# Patient Record
Sex: MALE | Race: WHITE | Employment: FULL TIME | URBAN - METROPOLITAN AREA
[De-identification: names, ages, dates, MRNs, and addresses within clinical notes are randomized per-mention and may not be internally consistent; named-entity substitution may affect disease eponyms.]

---

## 2024-06-17 ENCOUNTER — HOSPITAL ENCOUNTER (OUTPATIENT)
Age: 19
Discharge: HOME | End: 2024-06-17

## 2024-06-17 VITALS
RESPIRATION RATE: 16 BRPM | TEMPERATURE: 98 F | DIASTOLIC BLOOD PRESSURE: 71 MMHG | OXYGEN SATURATION: 99 % | WEIGHT: 185 LBS | BODY MASS INDEX: 21.84 KG/M2 | HEIGHT: 77 IN | SYSTOLIC BLOOD PRESSURE: 145 MMHG | HEART RATE: 57 BPM

## 2024-06-17 DIAGNOSIS — J01.90 ACUTE NON-RECURRENT SINUSITIS, UNSPECIFIED LOCATION: ICD-10-CM

## 2024-06-17 DIAGNOSIS — R04.0 EPISTAXIS: Primary | ICD-10-CM

## 2024-06-17 PROCEDURE — 99204 OFFICE O/P NEW MOD 45 MIN: CPT | Performed by: NURSE PRACTITIONER

## 2024-06-17 RX ORDER — METHYLPREDNISOLONE 4 MG/1
TABLET ORAL
Qty: 1 EACH | Refills: 0 | Status: SHIPPED | OUTPATIENT
Start: 2024-06-17

## 2024-06-17 NOTE — DISCHARGE INSTRUCTIONS
Start Medrol Dosepak in the morning with food.  Take once a day.  Once complete Medrol Dosepak may start using Flonase nasal spray which she can buy over-the-counter.  Take a daily antihistamine such as Zyrtec Allegra or Claritin or Xyzal.  Follow-up with your primary care physician or ENT next 1 to 2 weeks if symptoms persist.  Go directly to the ER for any uncontrolled nosebleed

## 2024-06-18 NOTE — ED PROVIDER NOTES
Patient Seen in: [facility]      History     Chief Complaint   Patient presents with    Nose Bleed     Stated Complaint: nose bleeds    Subjective:   19-year-old male presents today with the intermittent bloody nose over the last 3 days.  States has a history of this in the past.  Has never seen anybody for this but is usually not as frequently as it has been over the last couple days.  Denies any sinus pressure congestion runny nose.  No fever or chills.  Bleeding has since stopped.  No other symptoms or concerns at this time.  The patient's medication list, past medical history and social history elements as listed in today's nurse's notes were reviewed and agreed (except as otherwise stated in the HPI).  The patient's family history reviewed and determined to be noncontributory to the presenting problem            Objective:   Past Medical History:    Arthritis              History reviewed. No pertinent surgical history.             Social History     Socioeconomic History    Marital status: Single     Social Determinants of Health      Received from [facility]    Social Connections    Received from [facility]    Housing Stability              Review of Systems    Positive for stated complaint: nose bleeds  Other systems are as noted in HPI.  Constitutional and vital signs reviewed.      All other systems reviewed and negative except as noted above.    Physical Exam     ED Triage Vitals [06/17/24 1825]   /71   Pulse 57   Resp 16   Temp 98.1 °F (36.7 °C)   Temp src Temporal   SpO2 99 %   O2 Device None (Room air)       Current Vitals:   Vital Signs  BP: 145/71  Pulse: 57  Resp: 16  Temp: 98.1 °F (36.7 °C)  Temp src: Temporal    Oxygen Therapy  SpO2: 99 %  O2 Device: None (Room air)            Physical Exam  Vitals and nursing note reviewed.   Constitutional:       Appearance: Normal appearance. He is well-developed.   HENT:      Head: Normocephalic.       Right Ear: Tympanic membrane and ear canal normal.      Left Ear: Tympanic membrane and ear canal normal.      Nose: Mucosal edema present.      Right Turbinates: Swollen.      Left Turbinates: Swollen.      Comments: Swelling and erythema to the turbinates.     Mouth/Throat:      Pharynx: Uvula midline. Posterior oropharyngeal erythema present.   Eyes:      Conjunctiva/sclera: Conjunctivae normal.      Pupils: Pupils are equal, round, and reactive to light.   Cardiovascular:      Rate and Rhythm: Normal rate and regular rhythm.   Pulmonary:      Effort: Pulmonary effort is normal.      Breath sounds: Normal breath sounds.   Musculoskeletal:      Cervical back: Normal range of motion and neck supple.   Lymphadenopathy:      Cervical: No cervical adenopathy.   Skin:     General: Skin is warm and dry.   Neurological:      Mental Status: He is alert and oriented to person, place, and time.               ED Course   Labs Reviewed - No data to display                   MDM     Please note that this report has been produced using speech recognition software and may contain errors related to that system including, but not limited to, errors in grammar, punctuation, and spelling, as well as words and phrases that possibly may have been recognized inappropriately.  If there are any questions or concerns, contact the dictating provider for clarification.        Note to patient: The 21st Century Cures Act makes medical notes like these available to patients in the interest of transparency. However, this is a medical document intended as peer to peer communication. It is written in medical language and may contain abbreviations or verbiage that are unfamiliar. It may appear blunt or direct. Medical documents are intended to carry relevant information, facts as evident, and the clinical opinion of the practitioner.                                   Medical Decision Making  Differential diagnosis includes but is not limited to:  Acute sinusitis, epistaxis      Presents today with frequent nosebleeds over the last 2 to 3 days.  On exam does have swelling and erythema to bilateral turbinates.  Will give prescription for Medrol Dosepak.  Also encouraged to take over-the-counter antihistamine daily.  To follow-up with primary care physician and/or ENT if symptoms do not improve.  To go directly there with any uncontrolled nasal bleeding.  Patient verbalized understanding and agreed to plan of care.    Risk  OTC drugs.  Prescription drug management.        Disposition and Plan     Clinical Impression:  1. Epistaxis    2. Acute non-recurrent sinusitis, unspecified location         Disposition:  Discharge  6/17/2024  6:42 pm    Follow-up:  Abe Oropeza MD  [address]  [facility] [address]  [phone]    In 1 week  As needed          Medications Prescribed:  Discharge Medication List as of 6/17/2024  6:41 PM        START taking these medications    Details   methylPREDNISolone (MEDROL) 4 MG Oral Tablet Therapy Pack Dosepack: take as directed, Normal, Disp-1 each, R-0

## 2025-03-27 ENCOUNTER — HOSPITAL ENCOUNTER (OUTPATIENT)
Age: 20
Discharge: HOME | End: 2025-03-27
Payer: COMMERCIAL

## 2025-03-27 VITALS
BODY MASS INDEX: 21 KG/M2 | RESPIRATION RATE: 18 BRPM | OXYGEN SATURATION: 96 % | SYSTOLIC BLOOD PRESSURE: 136 MMHG | TEMPERATURE: 99 F | HEART RATE: 86 BPM | WEIGHT: 180 LBS | DIASTOLIC BLOOD PRESSURE: 83 MMHG

## 2025-03-27 DIAGNOSIS — R30.0 DYSURIA: Primary | ICD-10-CM

## 2025-03-27 DIAGNOSIS — R35.0 URINARY FREQUENCY: ICD-10-CM

## 2025-03-27 DIAGNOSIS — R39.15 URINARY URGENCY: ICD-10-CM

## 2025-03-27 LAB
BILIRUB UR QL STRIP: NEGATIVE
CLARITY UR: CLEAR
COLOR UR: YELLOW
GLUCOSE UR STRIP-MCNC: NEGATIVE MG/DL
KETONES UR STRIP-MCNC: NEGATIVE MG/DL
LEUKOCYTE ESTERASE UR QL STRIP: NEGATIVE
NITRITE UR QL STRIP: NEGATIVE
PH UR STRIP: 6 [PH]
PROT UR STRIP-MCNC: NEGATIVE MG/DL
SP GR UR STRIP: >=1.03
UROBILINOGEN UR STRIP-ACNC: <2 MG/DL

## 2025-03-27 PROCEDURE — 99213 OFFICE O/P EST LOW 20 MIN: CPT | Performed by: NURSE PRACTITIONER

## 2025-03-27 PROCEDURE — 81002 URINALYSIS NONAUTO W/O SCOPE: CPT | Performed by: NURSE PRACTITIONER

## 2025-03-27 PROCEDURE — 87086 URINE CULTURE/COLONY COUNT: CPT | Performed by: NURSE PRACTITIONER

## 2025-03-27 NOTE — ED INITIAL ASSESSMENT (HPI)
Pt with urinary urgency and increased frequency, has pain with urination and feeling that he is unable to fully empty bladder that started today.

## 2025-03-27 NOTE — DISCHARGE INSTRUCTIONS
Call the urologist tomorrow or on Monday to arrange a follow-up appointment.    Go to the emergency department for new or worsening symptoms.

## 2025-03-27 NOTE — ED PROVIDER NOTES
Patient Seen in: [facility]      History     Chief Complaint   Patient presents with    Urinary Symptoms     Stated Complaint: trouble going to the bathroom    Subjective:   20-year-old male who presents with on and off urinary frequency and urgency over the last 3 years.  States this particular episode started today.  He also states there is some pain while urinating.  Denies any unusual color to the urine.  Denies blood in the urine.  Denies abdominal pain.  Denies nausea or vomiting.  Denies STD concerns.  Denies any unusual rashes or lesions.              Objective:     Past Medical History:    Arthritis              History reviewed. No pertinent surgical history.             Social History     Socioeconomic History    Marital status: Single   Tobacco Use    Smoking status: Never    Smokeless tobacco: Never   Vaping Use    Vaping status: Never Used     Social Drivers of Health     Food Insecurity: No Food Insecurity (2/6/2025)    Received from [facility]    Food Insecurity     Currently or in the past 3 months, have you worried your food would run out before you had money to buy more?: No     In the past 12 months, have you run out of food or been unable to get more?: No   Transportation Needs: No Transportation Needs (2/6/2025)    Received from [facility]    Transportation Needs     Currently or in the past 3 months, has lack of transportation kept you from medical appointments, getting food or medicine, or providing care to a family member?: Unrecognized value     Medical Transportation Needs?: No    Received from [facility]    Housing Stability              Review of Systems   Gastrointestinal: Negative.    Genitourinary:  Positive for frequency and urgency.       Positive for stated complaint: trouble going to the bathroom  Other systems are as noted in HPI.  Constitutional and vital signs reviewed.      All other systems reviewed and  negative except as noted above.    Physical Exam     ED Triage Vitals [03/27/25 1741]   /83   Pulse 86   Resp 18   Temp 98.6 °F (37 °C)   Temp src Oral   SpO2 96 %   O2 Device None (Room air)       Current Vitals:   Vital Signs  BP: 136/83  Pulse: 86  Resp: 18  Temp: 98.6 °F (37 °C)  Temp src: Oral    Oxygen Therapy  SpO2: 96 %  O2 Device: None (Room air)        Physical Exam  Vitals and nursing note reviewed.   Constitutional:       General: He is not in acute distress.     Appearance: Normal appearance. He is not ill-appearing, toxic-appearing or diaphoretic.   Cardiovascular:      Rate and Rhythm: Normal rate and regular rhythm.      Pulses: Normal pulses.      Heart sounds: Normal heart sounds.   Pulmonary:      Effort: Pulmonary effort is normal. No respiratory distress.      Breath sounds: Normal breath sounds.   Abdominal:      General: Abdomen is flat.      Palpations: Abdomen is soft.      Tenderness: There is no abdominal tenderness. There is no guarding.   Skin:     General: Skin is warm and dry.      Coloration: Skin is not jaundiced or pale.   Neurological:      Mental Status: He is alert and oriented to person, place, and time.   Psychiatric:         Behavior: Behavior normal.             ED Course     Labs Reviewed   [facility] POCT URINALYSIS DIPSTICK - Abnormal; Notable for the following components:       Result Value    Blood, Urine Trace-Intact (*)     All other components within normal limits   URINE CULTURE, ROUTINE                   MDM              Medical Decision Making  Differential diagnosis initially included but was not limited to: UTI, overactive bladder, nonspecific dysuria    Nontoxic adult male patient.  No abdominal tenderness.  No CVA tenderness.    Negative nitrates and leukocytes.  Trace blood.  The rest of the urine dip is unremarkable.  Will send for culture.  Patient to follow-up with urology    Supportive/home management of diagnosis/illness/injury discussed. Red flag symptoms  discussed.  Signs and symptoms/criteria that would necessitate reevaluation, including ER evaluation discussed.  Patient and/or responsible adult verbalize and agree with management and plan of care.    Speech recognition software was used during this dictation.  There may be minor errors in transcription.      Amount and/or Complexity of Data Reviewed  Labs: ordered. Decision-making details documented in ED Course.        Disposition and Plan     Clinical Impression:  1. Dysuria    2. Urinary frequency    3. Urinary urgency         Disposition:  Discharge  3/27/2025  6:25 pm    Follow-up:  Homer Child MD  [address] [place] [id number]  [phone]      Urology recommendation          Medications Prescribed:  Current Discharge Medication List              Supplementary Documentation:

## 2025-05-07 ENCOUNTER — OFFICE VISIT (OUTPATIENT)
Dept: SURGERY | Facility: CLINIC | Age: 20
End: 2025-05-07

## 2025-05-07 DIAGNOSIS — R10.2 PELVIC PAIN: ICD-10-CM

## 2025-05-07 DIAGNOSIS — N32.81 OAB (OVERACTIVE BLADDER): ICD-10-CM

## 2025-05-07 DIAGNOSIS — R82.90 URINE FINDING: Primary | ICD-10-CM

## 2025-05-07 LAB
APPEARANCE: CLEAR
BILIRUBIN: NEGATIVE
GLUCOSE (URINE DIPSTICK): NEGATIVE MG/DL
KETONES (URINE DIPSTICK): NEGATIVE MG/DL
MULTISTIX LOT#: ABNORMAL NUMERIC
NITRITE, URINE: NEGATIVE
OCCULT BLOOD: NEGATIVE
PH, URINE: 6.5 (ref 4.5–8)
PROTEIN (URINE DIPSTICK): NEGATIVE MG/DL
SPECIFIC GRAVITY: 1.02 (ref 1–1.03)
URINE-COLOR: YELLOW
UROBILINOGEN,SEMI-QN: 0.2 MG/DL (ref 0–1.9)

## 2025-05-07 PROCEDURE — 51798 US URINE CAPACITY MEASURE: CPT | Performed by: UROLOGY

## 2025-05-07 PROCEDURE — 99203 OFFICE O/P NEW LOW 30 MIN: CPT | Performed by: UROLOGY

## 2025-05-07 PROCEDURE — 81003 URINALYSIS AUTO W/O SCOPE: CPT | Performed by: UROLOGY

## 2025-05-07 NOTE — PROGRESS NOTES
Urology Clinic Note - New Patient    Referring Provider:  No referring provider defined for this encounter.     Primary Care Provider:  Trini Dodge     Chief Complaint:     Urinary urgency, pelvic pain    HPI:     Jg Madden is a 20 year old male with no significant previous medical history referred for urinary urgency.    Patient presents for evaluation of urgency for the past 3 years.  Patient was recently seen in the urgent care for ongoing urinary frequency and urgency which was more severe in nature but has been going on for about 3 years as noted above.  Urine was negative at that urgent care visit.  Today, he voices ongoing urgency with small voids.  He has occasional dysuria with incomplete bladder emptying and weak stream.  Reports that he has these experiences only a few times a month.  Usually then recovers and goes back to baseline normal voiding.  Today's voiding is at baseline he has no significant complaints.  He has no previous urologic history.  No history of stones or UTI.  His AUA symptom score is 8 and his PVR today is 48.  His UA is negative for blood or significant infection.  Occasional split stream. Patient does report drinking 3 sodas daily, he is cut down to 1 recently without significant change to his urination.  No significant constipation.  No history of urologic trauma        PSA:  No results found for: \"PSA\", \"PERCENTPSA\", \"PSAS\", \"PSAULTRA\", \"QPSA\", \"PSATOT\", \"TOTPSADX\", \"TOTPSASCREEN\"   No Cr or GFR on file.      History:     Past Medical History:    Arthritis       No past surgical history on file.    No family history on file.    Social History     Socioeconomic History    Marital status: Single   Tobacco Use    Smoking status: Never    Smokeless tobacco: Never   Vaping Use    Vaping status: Never Used     Social Drivers of Health     Food Insecurity: No Food Insecurity (2/6/2025)    Received from [facility]    Food Insecurity     Currently or in  the past 3 months, have you worried your food would run out before you had money to buy more?: No     In the past 12 months, have you run out of food or been unable to get more?: No   Transportation Needs: No Transportation Needs (2/6/2025)    Received from [facility]    Transportation Needs     Currently or in the past 3 months, has lack of transportation kept you from medical appointments, getting food or medicine, or providing care to a family member?: No     Medical Transportation Needs?: No    Received from [facility]    Housing Stability       Medications (Active prior to today's visit):  Current Outpatient Medications   Medication Sig Dispense Refill    methylPREDNISolone (MEDROL) 4 MG Oral Tablet Therapy Pack Dosepack: take as directed (Patient not taking: Reported on 5/7/2025) 1 each 0    ibuprofen 200 MG Oral Tab Take 200 mg by mouth every 6 (six) hours as needed for Pain. (Patient not taking: Reported on 5/7/2025)         Allergies:  Allergies   Allergen Reactions    Meloxicam OTHER (SEE COMMENTS)     Abd. pain         Review of Systems:   A comprehensive 10-point review of systems was completed.  Pertinent positives and negatives are noted in the the HPI.    Physical Exam:   CONSTITUTIONAL: Well developed, well nourished, in no acute distress  NEUROLOGIC: Alert and oriented  HEAD: Normocephalic, atraumatic  ENT: Hearing intact   RESPIRATORY: Normal respiratory effort  SKIN: No evident rashes  ABDOMEN: Soft, non-tender, non-distended,     No results found.      Assessment & Plan:     LUTS  Pelvic pain, feeling of incomplete emptying    Discussed patient's symptoms in detail.  Patient has no overt risk factors for the above symptoms, reassuring that this only happens a few times a month.  It does bother him quite a bit.  I discussed cystoscopy to rule out a urethral stricture given his slow stream, split stream.  He would like to do this.  Discussed behavioral  modification.  Discussed cutting down on soda.  Discussed potential pelvic floor physical therapy in the future.  Finally, discussed OAB medication if this becomes more bothersome.    Thank you for this consult.    I have personally reviewed all relevant medical records, labs, and imaging.      Kwasi Schrader MD  Staff Urologist  [facility]  Office: [phone]

## 2025-06-26 ENCOUNTER — PROCEDURE (OUTPATIENT)
Facility: LOCATION | Age: 20
End: 2025-06-26
Payer: COMMERCIAL

## 2025-06-26 ENCOUNTER — TELEPHONE (OUTPATIENT)
Dept: SURGERY | Facility: CLINIC | Age: 20
End: 2025-06-26

## 2025-06-26 DIAGNOSIS — R82.90 URINE FINDING: Primary | ICD-10-CM

## 2025-06-26 PROCEDURE — 99213 OFFICE O/P EST LOW 20 MIN: CPT | Performed by: UROLOGY

## 2025-06-26 PROCEDURE — 52000 CYSTOURETHROSCOPY: CPT | Performed by: UROLOGY

## 2025-06-26 RX ORDER — SULFAMETHOXAZOLE AND TRIMETHOPRIM 800; 160 MG/1; MG/1
1 TABLET ORAL ONCE
Status: COMPLETED | OUTPATIENT
Start: 2025-06-26 | End: 2025-06-26

## 2025-06-26 RX ADMIN — SULFAMETHOXAZOLE AND TRIMETHOPRIM 1 TABLET: 800; 160 TABLET ORAL at 09:47:00

## 2025-06-26 NOTE — PROGRESS NOTES
Clinic Procedure Note    INDICATIONS:      Jg Madden is a 20 year old male with no significant previous medical history referred for urinary urgency.     Patient presents for evaluation of urgency for the past 3 years.  Patient was recently seen in the urgent care for ongoing urinary frequency and urgency which was more severe in nature but has been going on for about 3 years as noted above.  Urine was negative at that urgent care visit.    Patient saw me for evaluation last month.  At that time he noted urgency with small voids.  Occasional dysuria.  Occasional weak and spraying stream.  His PVR was 48.  He had no history of stones or UTI.  No other urologic history.  UA was normal.  No significant constipation.  Had cut down on soda recently.  No urologic trauma.  The above, I offered patient cystoscopy to rule out a urethral stricture.  He now presents for this.        PROCEDURE:       1. Flexible cystourethroscopy    DATE OF PROCEDURE: 2025     PRE-PROCEDURE DIAGNOSIS: LUTS    POST-PROCEDURE DIAGNOSIS: LUTS, bulbar urethral stricture     SURGEON: Kwasi Schrader MD    FINDINGS:    Urethra: Orthotopic meatus, normal penile urethra, tight approximately 6 [place]an bulbar urethral stricture noted; unable to pass scope.  Patient did not want bedside dilation. Scope was then removed.     PROCEDURE:   Patient was brought to the procedure suite and a time-out was performed identifiying the patient,  and procedure to be performed. The risks and benefits of the procedure were once again discussed with the patient including bleeding, infection, and dysuria. The patient agreed to proceed. The patient did not have any signs or symptoms of active UTI.    He was placed in supine position on the table and the penis was prepped and draped in the standard sterile fashion. Urojet was instilled per urethra for local anesthetic effect. A flexible cystoscope was inserted per urethra.  There was a tight approximately  6 Maltese bulbar urethral stricture that was not traversable with scope.  Patient did not want bedside dilation.  The scope was then carefully removed.    There were no complications and the patient tolerated the procedure well.    IMPRESSION AND PLAN:     LUTS  Urethral stricture    I discussed cystoscopic findings in detail with patient.  I offered patient bedside dilation with David sounds over a wire.  He declined this today.  Think this is reasonable given his symptoms are controlled at this time that he is emptying his bladder well.  We then outlined the below options for management.  After detailed discussion patient is most interested in the most definitive repair which would be urethroplasty given his young age.  We discussed the risks and benefits of procedure in detail and expected postoperative course.  He will see a reconstructive urologist.  Information for this was sent to him.    1. Urethral stricture  - we discussed the options for management of his urethral stricture, which include:  1. Observation. The patient is aware that this would allow him to avoid any surgical interventions, however, he would likely have progressive weakening of his urinary stream, urinary retention, urinary tract infections and bladder dysfunction.  2. Chronic drainage with urethral catheter or suprapubic tube. This would spare him from major surgical reconstruction but would need to have catheter or SPT exchanged monthly and raises risk of infection.  3. Endoscopic management (urethral dilation vs DVIU). The patient is aware that this would allow him to avoid major reconstructive surgery. However, long term outcomes of endoscopic management, compared to surgical reconstruction, is associated with reduced success rates. Urethral dilation and DVIU have similar long-term outcomes in the treatment of 1st time short (<2cm) strictures, with success ranging from 35-60%. Subsequent dilation/DVIU has long term success rate  approaching 0%.  4. Bulbar urethroplasty +/- graft substitution repair. Although this is the most invasive option, this would give the best chance at long term resolution of his urethral stricture disease with long-term success rates being > 90% in most modern series looking at anastomotic urethroplasty and 80-85% recurrence-free rate in the case of a graft substitution repair.       In total, 20 additional minutes were spent on this patient encounter (including chart review, patient history, physical, and counseling, documentation, and communication).        Kwasi Schrader MD  Staff Urologist  [facility]  Office: [phone]

## 2025-06-26 NOTE — TELEPHONE ENCOUNTER
Patient without mychart- please call and provide him with the below contact information for reconstructive urologist      Beti-    [phone]   MD Lui Crum MD

## 2025-06-26 NOTE — TELEPHONE ENCOUNTER
Phoned Patient to discuss below.  No answer, left voice message to call [phone] ext. [phone].

## 2025-06-27 NOTE — TELEPHONE ENCOUNTER
RN phoned Patient to relay Dr. Schrader message for the Reconstructive Urologist @ [place], [phone] for Dr. Pina Pritchard MD and Jose C Zhou MD.  Patient agreeable with plan.  This Encounter is now closed.